# Patient Record
(demographics unavailable — no encounter records)

---

## 2025-04-10 NOTE — ASSESSMENT
[FreeTextEntry1] : Anxiety Atypical chest pain - multiple ED visits for chest pain EKG WNL, Trop neg, BNP neg - willing try sertraline trial - rtc 1 mo  GERD - c/w pantoprazole  Chronic back pain  - willing to try gabapentin  etoh use - 1 bottle scotch 2-3days - gabapentin qhs  Tobacco use - recently quit smoking   Hip Pain - f/u MR hip
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
## 2025-05-02 NOTE — HISTORY OF PRESENT ILLNESS
[Former] : Former [>=20 Pack-Years] : Twenty pack years or greater smoking history: Yes [TextBox_13] :  Mr. RAMSEY ARORA is a 51 year old man with a history of nicotine dependence   Over the telephone today we reviewed and confirmed that the patient meets screening eligibility criteria:  -Age: 51 years old  Smoking status:  -Former smoker  -Number of pack(s) per day: 1+  -Number of years smoked: 30  -Number of pack years smokin  -Number of years since quitting smokin month ago     Mr. ARORA denies any personal history of lung cancer. Denies any s/s of lung cancer. H/o lung ca in father. Denies any history of lung disease. Denies any history of occupational exposures [YearQuit] : 2025 [PacksperDay] : 1 [N_Years] : 30 [PacksperYear] : 30

## 2025-05-02 NOTE — REASON FOR VISIT
[Home] : at home, [unfilled] , at the time of the visit. [Medical Office: ([facility])___] : at the medical office located in  [Telephone (audio)] : This telephonic visit was provided via audio only technology. [Verbal consent obtained from patient] : the patient, [unfilled] [Initial Evaluation] : an initial evaluation visit [Review of Eligibility] : review of eligibility [Low-Dose CT Screening Discussion] : low-dose CT lung cancer screening discussion [Virtual Visit] : virtual visit

## 2025-05-08 NOTE — HISTORY OF PRESENT ILLNESS
[TextBox_13] :  Mr. RAMSEY ARORA is a 51 year old man with a history of nicotine dependence   Over the telephone today we reviewed and confirmed that the patient meets screening eligibility criteria:  -Age: 51 years old  Smoking status:  -Former smoker  -Number of pack(s) per day: 1+  -Number of years smoked: 30  -Number of pack years smokin  -Number of years since quitting smokin month ago     Mr. ARORA denies any personal history of lung cancer. Denies any s/s of lung cancer. H/o lung ca in father. Denies any history of lung disease. Denies any history of occupational exposures [YearQuit] : 2025 [PacksperDay] : 1 [N_Years] : 30 [PacksperYear] : 30

## 2025-07-02 NOTE — PHYSICAL EXAM
[No Acute Distress] : no acute distress [Well Nourished] : well nourished [Well Developed] : well developed [Normal Sclera/Conjunctiva] : normal sclera/conjunctiva [Normal Outer Ear/Nose] : the outer ears and nose were normal in appearance [No Edema] : there was no peripheral edema [Normal] : soft, non-tender, non-distended, no masses palpated, no HSM and normal bowel sounds [Normal Affect] : the affect was normal [de-identified] : R hip pain with movement.
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
## 2025-07-02 NOTE — PHYSICAL EXAM
[No Acute Distress] : no acute distress [Well Nourished] : well nourished [Well Developed] : well developed [Normal Sclera/Conjunctiva] : normal sclera/conjunctiva [Normal Outer Ear/Nose] : the outer ears and nose were normal in appearance [No Edema] : there was no peripheral edema [Normal] : soft, non-tender, non-distended, no masses palpated, no HSM and normal bowel sounds [Normal Affect] : the affect was normal [de-identified] : R hip pain with movement.

## 2025-07-02 NOTE — END OF VISIT
[] : Resident [FreeTextEntry3] : I, Dr. Eagle Calloway, saw and evaluated this patient in the presence of the resident. I discussed the management with the resident. I reviewed the note and agree with the documented plan of care with the following confirmations/corrections/additions: None.
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
## 2025-07-02 NOTE — REVIEW OF SYSTEMS
[Joint Pain] : joint pain [Back Pain] : back pain [Negative] : Heme/Lymph [Chest Pain] : no chest pain [Shortness Of Breath] : no shortness of breath [Abdominal Pain] : no abdominal pain
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
